# Patient Record
Sex: FEMALE | Race: OTHER | Employment: OTHER | URBAN - NONMETROPOLITAN AREA
[De-identification: names, ages, dates, MRNs, and addresses within clinical notes are randomized per-mention and may not be internally consistent; named-entity substitution may affect disease eponyms.]

---

## 2024-01-31 ENCOUNTER — HOSPITAL ENCOUNTER (OUTPATIENT)
Dept: INFUSION THERAPY | Age: 89
Discharge: HOME | End: 2024-01-31
Payer: MEDICARE

## 2024-01-31 ENCOUNTER — OFFICE VISIT (OUTPATIENT)
Dept: ONCOLOGY | Age: 89
End: 2024-01-31
Payer: MEDICARE

## 2024-01-31 VITALS
DIASTOLIC BLOOD PRESSURE: 70 MMHG | TEMPERATURE: 98.7 F | SYSTOLIC BLOOD PRESSURE: 151 MMHG | HEART RATE: 79 BPM | HEIGHT: 60 IN | RESPIRATION RATE: 16 BRPM | OXYGEN SATURATION: 97 % | WEIGHT: 125.8 LBS | BODY MASS INDEX: 24.7 KG/M2

## 2024-01-31 VITALS
TEMPERATURE: 98.7 F | RESPIRATION RATE: 16 BRPM | HEART RATE: 79 BPM | OXYGEN SATURATION: 97 % | SYSTOLIC BLOOD PRESSURE: 151 MMHG | DIASTOLIC BLOOD PRESSURE: 70 MMHG

## 2024-01-31 DIAGNOSIS — D53.9 MACROCYTIC ANEMIA: Primary | ICD-10-CM

## 2024-01-31 DIAGNOSIS — D53.9 MACROCYTIC ANEMIA: ICD-10-CM

## 2024-01-31 LAB
BASOPHILS # BLD AUTO: 0 THOU/MM3 (ref 0–0.1)
BASOPHILS NFR BLD AUTO: 0 % (ref 0–3)
BUN BLDP-MCNC: 16 MG/DL (ref 8–26)
CHLORIDE BLD-SCNC: 109 MEQ/L (ref 98–109)
CREAT BLD-MCNC: 0.5 MG/DL (ref 0.5–1.2)
EOSINOPHIL # BLD AUTO: 0.2 THOU/MM3 (ref 0–0.4)
EOSINOPHIL NFR BLD AUTO: 3 % (ref 0–4)
ERYTHROCYTE [DISTWIDTH] IN BLOOD BY AUTOMATED COUNT: 13.4 % (ref 11.5–14.5)
GFR SERPL CREATININE-BSD FRML MDRD: > 60 ML/MIN/1.73M2
GLUCOSE BLD-MCNC: 101 MG/DL (ref 70–108)
HCT VFR BLD AUTO: 40.2 % (ref 37–47)
HGB BLD-MCNC: 13.2 GM/DL (ref 12–16)
HGB RETIC QN AUTO: 37.2 PG (ref 28.2–35.7)
IMM GRANULOCYTES # BLD AUTO: 0.01 THOU/MM3 (ref 0–0.07)
IMM GRANULOCYTES NFR BLD AUTO: 0 %
IMM RETICS NFR: 9.6 % (ref 3–15.9)
IONIZED CALCIUM, WHOLE BLOOD: 1.23 MMOL/L (ref 1.12–1.32)
LYMPHOCYTES # BLD AUTO: 2 THOU/MM3 (ref 1–4.8)
LYMPHOCYTES NFR BLD AUTO: 39 % (ref 15–47)
MCH RBC QN AUTO: 32.7 PG (ref 26–33)
MCHC RBC AUTO-ENTMCNC: 32.8 GM/DL (ref 32.2–35.5)
MCV RBC AUTO: 100 FL (ref 81–99)
MONOCYTES # BLD AUTO: 0.4 THOU/MM3 (ref 0.4–1.3)
MONOCYTES NFR BLD AUTO: 7 % (ref 0–12)
NEUTROPHILS NFR BLD AUTO: 50 % (ref 43–75)
PLATELET # BLD AUTO: 160 THOU/MM3 (ref 130–400)
PMV BLD AUTO: 10.8 FL (ref 9.4–12.4)
POTASSIUM BLD-SCNC: 4.2 MEQ/L (ref 3.5–4.9)
RBC # BLD AUTO: 4.04 MILL/MM3 (ref 4.2–5.4)
RETICS # AUTO: 61 THOU/MM3 (ref 20–115)
RETICS/RBC NFR AUTO: 1.5 % (ref 0.5–2)
SEGMENTED NEUTROPHILS ABSOLUTE COUNT: 2.5 THOU/MM3 (ref 1.8–7.7)
SODIUM BLD-SCNC: 140 MEQ/L (ref 138–146)
TOTAL CO2, WHOLE BLOOD: 26 MEQ/L (ref 23–33)
WBC # BLD AUTO: 5.1 THOU/MM3 (ref 4.8–10.8)

## 2024-01-31 PROCEDURE — G8484 FLU IMMUNIZE NO ADMIN: HCPCS | Performed by: NURSE PRACTITIONER

## 2024-01-31 PROCEDURE — 84439 ASSAY OF FREE THYROXINE: CPT

## 2024-01-31 PROCEDURE — 1090F PRES/ABSN URINE INCON ASSESS: CPT | Performed by: NURSE PRACTITIONER

## 2024-01-31 PROCEDURE — 82746 ASSAY OF FOLIC ACID SERUM: CPT

## 2024-01-31 PROCEDURE — 1123F ACP DISCUSS/DSCN MKR DOCD: CPT | Performed by: NURSE PRACTITIONER

## 2024-01-31 PROCEDURE — 83010 ASSAY OF HAPTOGLOBIN QUANT: CPT

## 2024-01-31 PROCEDURE — G8420 CALC BMI NORM PARAMETERS: HCPCS | Performed by: NURSE PRACTITIONER

## 2024-01-31 PROCEDURE — 85025 COMPLETE CBC W/AUTO DIFF WBC: CPT

## 2024-01-31 PROCEDURE — 99211 OFF/OP EST MAY X REQ PHY/QHP: CPT

## 2024-01-31 PROCEDURE — 1036F TOBACCO NON-USER: CPT | Performed by: NURSE PRACTITIONER

## 2024-01-31 PROCEDURE — G8427 DOCREV CUR MEDS BY ELIG CLIN: HCPCS | Performed by: NURSE PRACTITIONER

## 2024-01-31 PROCEDURE — 80076 HEPATIC FUNCTION PANEL: CPT

## 2024-01-31 PROCEDURE — 99203 OFFICE O/P NEW LOW 30 MIN: CPT | Performed by: NURSE PRACTITIONER

## 2024-01-31 PROCEDURE — 83615 LACTATE (LD) (LDH) ENZYME: CPT

## 2024-01-31 PROCEDURE — 80047 BASIC METABLC PNL IONIZED CA: CPT

## 2024-01-31 PROCEDURE — 84443 ASSAY THYROID STIM HORMONE: CPT

## 2024-01-31 PROCEDURE — 83521 IG LIGHT CHAINS FREE EACH: CPT

## 2024-01-31 PROCEDURE — 84155 ASSAY OF PROTEIN SERUM: CPT

## 2024-01-31 PROCEDURE — 82784 ASSAY IGA/IGD/IGG/IGM EACH: CPT

## 2024-01-31 PROCEDURE — 86334 IMMUNOFIX E-PHORESIS SERUM: CPT

## 2024-01-31 PROCEDURE — 84165 PROTEIN E-PHORESIS SERUM: CPT

## 2024-01-31 PROCEDURE — 82607 VITAMIN B-12: CPT

## 2024-01-31 PROCEDURE — 82525 ASSAY OF COPPER: CPT

## 2024-01-31 PROCEDURE — 85046 RETICYTE/HGB CONCENTRATE: CPT

## 2024-01-31 RX ORDER — OMEPRAZOLE 20 MG/1
CAPSULE, DELAYED RELEASE ORAL
COMMUNITY
Start: 2023-11-04

## 2024-01-31 RX ORDER — LISINOPRIL 10 MG/1
10 TABLET ORAL DAILY
COMMUNITY

## 2024-01-31 RX ORDER — HYDROCODONE BITARTRATE AND ACETAMINOPHEN 5; 325 MG/1; MG/1
TABLET ORAL
COMMUNITY
Start: 2023-11-06

## 2024-01-31 RX ORDER — ALENDRONATE SODIUM 70 MG/1
TABLET ORAL
COMMUNITY
Start: 2024-01-27

## 2024-01-31 RX ORDER — ATORVASTATIN CALCIUM 80 MG/1
TABLET, FILM COATED ORAL
COMMUNITY
Start: 2023-11-04

## 2024-01-31 RX ORDER — ASPIRIN 81 MG/1
81 TABLET, CHEWABLE ORAL DAILY
COMMUNITY

## 2024-01-31 RX ORDER — GABAPENTIN 300 MG/1
CAPSULE ORAL
COMMUNITY
Start: 2024-01-16

## 2024-01-31 RX ORDER — MIRTAZAPINE 15 MG/1
TABLET, FILM COATED ORAL
COMMUNITY
Start: 2023-11-04

## 2024-01-31 RX ORDER — CELECOXIB 200 MG/1
CAPSULE ORAL
COMMUNITY
Start: 2023-11-04

## 2024-01-31 RX ORDER — TRIAMTERENE AND HYDROCHLOROTHIAZIDE 37.5; 25 MG/1; MG/1
1 TABLET ORAL DAILY
COMMUNITY

## 2024-01-31 NOTE — PROGRESS NOTES
[facility] [place] [facility]  [facility] [place] [facility]  [address]  [address]  [place] [address]  Dept: [phone]  Loc: [phone]       Visit Date:1/31/2024     Gosia Garcia is a 88 y.o. female who presents today for:   Chief Complaint   Patient presents with    Follow-up     Elevated MCV        HPI:   Gosia Garcia is a 88 y.o. female referred to Hematology/Oncology clinic by Dr. Simmons for evaluation of elevated MCV.  The patient has a history of HTN, hyperlipidemia, GERD, kidney stone, eczema and osteoporosis on treatment with Fosamax.    06/28/2017 WBC 5.8, Hgb 12.9, HCT 40.1%, MCV 95.7, RDW 14.2% and platelet count 172,000.  GFR and BMP results within normal limits.  Serum protein and calcium levels within normal limits.    06/06/2018 WBC 4.0, Hgb 13.2, HCT 39.5%, MCV 94, RDW 14% and platelet count 170,000.  GFR, BMP and hepatic function panel results within normal limits.    01/26/2021 WBC 8.6, Hgb 13.1, HCT 39.9%, MCV 97.1, RDW 14.4% and platelet count 173,000.  GFR and BMP results within normal limits.    11/08/2023 WBC 4.9, Hgb 12.1, HCT 37.7%, , RDW 14.1% and platelet count 192,000.    01/03/2024 WBC 5.4, Hgb 12.9, HCT 39.2%, .6, RDW 13.7% and platelet count 193,000.    The patient denies any headaches, dizziness or vision changes.  No fevers or drenching night sweats.  She is maintaining a good appetite and stable weight.  She denies any chest pain, cough or SOB.  No abdominal pain or GI issues.    GFR and BMP results within normal limits.  WBC 5.1, Hgb 13.2, HCT 40.2%, , RDW 13.4% and platelet count 160,000.           PMH, SH, and FH:  I reviewed the patient's medication and allergy lists. The PMH, SH, and FH were also reviewed as noted on the EMR.        Past Medical History:   Diagnosis Date    Arthritis     Hypertension       Past Surgical History:   Procedure Laterality Date    HYSTERECTOMY, VAGINAL  1970    LITHOTRIPSY  2013      Family

## 2024-02-01 LAB
ALBUMIN SERPL BCG-MCNC: 4.5 G/DL (ref 3.5–5.1)
ALP SERPL-CCNC: 94 U/L (ref 38–126)
ALT SERPL W/O P-5'-P-CCNC: 22 U/L (ref 11–66)
AST SERPL-CCNC: 28 U/L (ref 5–40)
BILIRUB CONJ SERPL-MCNC: < 0.2 MG/DL (ref 0–0.3)
BILIRUB SERPL-MCNC: 0.3 MG/DL (ref 0.3–1.2)
FOLATE SERPL-MCNC: > 20 NG/ML (ref 4.8–24.2)
LDH SERPL L TO P-CCNC: 186 U/L (ref 100–190)
PROT SERPL-MCNC: 7.7 G/DL (ref 6.1–8)
T4 FREE SERPL-MCNC: 1.31 NG/DL (ref 0.93–1.76)
TSH SERPL DL<=0.005 MIU/L-ACNC: 4.78 UIU/ML (ref 0.4–4.2)
VIT B12 SERPL-MCNC: 644 PG/ML (ref 211–911)

## 2024-02-03 LAB
COPPER SERPL-MCNC: 114.3 UG/DL (ref 80–155)
HAPTOGLOB SERPL-MCNC: 152 MG/DL (ref 30–200)
KAPPA/LAMBDA FREE LIGHT CHAINS: NORMAL

## 2024-02-04 LAB — IMMUNOFIXATION WITH QUANT: NORMAL

## 2024-05-01 DIAGNOSIS — D53.9 MACROCYTIC ANEMIA: Primary | ICD-10-CM

## 2024-05-06 ENCOUNTER — OFFICE VISIT (OUTPATIENT)
Dept: ONCOLOGY | Age: 89
End: 2024-05-06

## 2024-05-06 ENCOUNTER — HOSPITAL ENCOUNTER (OUTPATIENT)
Dept: INFUSION THERAPY | Age: 89
Discharge: HOME | End: 2024-05-06
Payer: MEDICARE

## 2024-05-06 VITALS
HEIGHT: 60 IN | HEART RATE: 71 BPM | OXYGEN SATURATION: 96 % | DIASTOLIC BLOOD PRESSURE: 67 MMHG | WEIGHT: 126.4 LBS | SYSTOLIC BLOOD PRESSURE: 137 MMHG | RESPIRATION RATE: 16 BRPM | BODY MASS INDEX: 24.81 KG/M2 | TEMPERATURE: 99 F

## 2024-05-06 VITALS
SYSTOLIC BLOOD PRESSURE: 137 MMHG | TEMPERATURE: 99 F | HEART RATE: 71 BPM | OXYGEN SATURATION: 96 % | RESPIRATION RATE: 16 BRPM | DIASTOLIC BLOOD PRESSURE: 67 MMHG

## 2024-05-06 DIAGNOSIS — R71.8 OTHER ABNORMALITY OF RED BLOOD CELLS: ICD-10-CM

## 2024-05-06 DIAGNOSIS — D53.9 MACROCYTIC ANEMIA: ICD-10-CM

## 2024-05-06 DIAGNOSIS — D53.9 MACROCYTIC ANEMIA: Primary | ICD-10-CM

## 2024-05-06 LAB
BASOPHILS # BLD AUTO: 0.1 THOU/MM3 (ref 0–0.1)
BASOPHILS NFR BLD AUTO: 1 % (ref 0–3)
BUN BLDP-MCNC: 13 MG/DL (ref 8–26)
CHLORIDE BLD-SCNC: 102 MEQ/L (ref 98–109)
CREAT BLD-MCNC: 0.6 MG/DL (ref 0.5–1.2)
EOSINOPHIL # BLD AUTO: 0.2 THOU/MM3 (ref 0–0.4)
EOSINOPHIL NFR BLD AUTO: 5 % (ref 0–4)
ERYTHROCYTE [DISTWIDTH] IN BLOOD BY AUTOMATED COUNT: 13.9 % (ref 11.5–14.5)
GFR SERPL CREATININE-BSD FRML MDRD: 87 ML/MIN/1.73M2
GLUCOSE BLD-MCNC: 97 MG/DL (ref 70–108)
HCT VFR BLD AUTO: 36.5 % (ref 37–47)
HGB BLD-MCNC: 12.2 GM/DL (ref 12–16)
IMM GRANULOCYTES # BLD AUTO: 0.01 THOU/MM3 (ref 0–0.07)
IMM GRANULOCYTES NFR BLD AUTO: 0 %
IONIZED CALCIUM, WHOLE BLOOD: 1.23 MMOL/L (ref 1.12–1.32)
LYMPHOCYTES # BLD AUTO: 1.4 THOU/MM3 (ref 1–4.8)
LYMPHOCYTES NFR BLD AUTO: 29 % (ref 15–47)
MCH RBC QN AUTO: 32.9 PG (ref 26–33)
MCHC RBC AUTO-ENTMCNC: 33.4 GM/DL (ref 32.2–35.5)
MCV RBC AUTO: 98 FL (ref 81–99)
MONOCYTES # BLD AUTO: 0.4 THOU/MM3 (ref 0.4–1.3)
MONOCYTES NFR BLD AUTO: 7 % (ref 0–12)
NEUTROPHILS ABSOLUTE: 2.8 THOU/MM3 (ref 1.8–7.7)
NEUTROPHILS NFR BLD AUTO: 58 % (ref 43–75)
PLATELET # BLD AUTO: 177 THOU/MM3 (ref 130–400)
PMV BLD AUTO: 10.6 FL (ref 9.4–12.4)
POTASSIUM BLD-SCNC: 4.6 MEQ/L (ref 3.5–4.9)
RBC # BLD AUTO: 3.71 MILL/MM3 (ref 4.2–5.4)
SODIUM BLD-SCNC: 137 MEQ/L (ref 138–146)
TOTAL CO2, WHOLE BLOOD: 27 MEQ/L (ref 23–33)
WBC # BLD AUTO: 4.9 THOU/MM3 (ref 4.8–10.8)

## 2024-05-06 PROCEDURE — 85025 COMPLETE CBC W/AUTO DIFF WBC: CPT

## 2024-05-06 PROCEDURE — 86334 IMMUNOFIX E-PHORESIS SERUM: CPT

## 2024-05-06 PROCEDURE — 80076 HEPATIC FUNCTION PANEL: CPT

## 2024-05-06 PROCEDURE — 84165 PROTEIN E-PHORESIS SERUM: CPT

## 2024-05-06 PROCEDURE — 83550 IRON BINDING TEST: CPT

## 2024-05-06 PROCEDURE — 99211 OFF/OP EST MAY X REQ PHY/QHP: CPT

## 2024-05-06 PROCEDURE — 83521 IG LIGHT CHAINS FREE EACH: CPT

## 2024-05-06 PROCEDURE — 82784 ASSAY IGA/IGD/IGG/IGM EACH: CPT

## 2024-05-06 PROCEDURE — 83540 ASSAY OF IRON: CPT

## 2024-05-06 PROCEDURE — 82728 ASSAY OF FERRITIN: CPT

## 2024-05-06 PROCEDURE — 80047 BASIC METABLC PNL IONIZED CA: CPT

## 2024-05-06 PROCEDURE — 84155 ASSAY OF PROTEIN SERUM: CPT

## 2024-05-06 NOTE — PROGRESS NOTES
[facility] [place] [facility]  [facility] [place] [facility]  [address]  [address]  [place] [address]  Dept: [phone]  Loc: [phone]       Visit Date:5/6/2024     Gosia Garcia is a 88 y.o. female who presents today for:   Chief Complaint   Patient presents with    Follow-up     Macrocytic anemia          HPI:   Gosia Garcia is a 88 y.o. female with elevated MCV.  The patient has a history of HTN, hyperlipidemia, GERD, kidney stone, eczema and osteoporosis on treatment with Fosamax.     06/28/2017 WBC 5.8, Hgb 12.9, HCT 40.1%, MCV 95.7, RDW 14.2% and platelet count 172,000.  GFR and BMP results within normal limits.  Serum protein and calcium levels within normal limits.     06/06/2018 WBC 4.0, Hgb 13.2, HCT 39.5%, MCV 94, RDW 14% and platelet count 170,000.  GFR, BMP and hepatic function panel results within normal limits.     01/26/2021 WBC 8.6, Hgb 13.1, HCT 39.9%, MCV 97.1, RDW 14.4% and platelet count 173,000.  GFR and BMP results within normal limits.     11/08/2023 WBC 4.9, Hgb 12.1, HCT 37.7%, , RDW 14.1% and platelet count 192,000.     01/03/2024 WBC 5.4, Hgb 12.9, HCT 39.2%, .6, RDW 13.7% and platelet count 193,000.     01/31/2024 The patient was seen for initial evaluation. WBC 5.1, Hgb 13.2, HCT 40.2%, , RDW 13.4% and platelet count 160,000.  Absolute reticulocyte count, vitamin B12, copper and folate levels within normal limits.  GFR, BMP and hepatic function panel results within normal limits.  Haptoglobin and LDH level within normal limits.  TSH level slightly elevated at 4.780, free T4 within normal limits.  SPEP/MARIA EUGENIA interpretation shows a normal SPEP pattern, no monoclonal protein seen.  Kappa free light chains elevated at 30.87, lambda free light chains within normal limits and kappa/lambda free light chain ratio within normal limits at 1.32            Interval History 5/7/2024: The patient returns for follow-up on her elevated MCV level.

## 2024-05-07 LAB
ALBUMIN SERPL BCG-MCNC: 4.1 G/DL (ref 3.5–5.1)
ALP SERPL-CCNC: 83 U/L (ref 38–126)
ALT SERPL W/O P-5'-P-CCNC: 25 U/L (ref 11–66)
AST SERPL-CCNC: 29 U/L (ref 5–40)
BILIRUB CONJ SERPL-MCNC: < 0.2 MG/DL (ref 0–0.3)
BILIRUB SERPL-MCNC: 0.3 MG/DL (ref 0.3–1.2)
FERRITIN SERPL IA-MCNC: 47 NG/ML (ref 10–291)
IRON SATN MFR SERPL: 48 % (ref 20–50)
IRON SERPL-MCNC: 118 UG/DL (ref 50–170)
PROT SERPL-MCNC: 7.3 G/DL (ref 6.1–8)
REVIEWED BY: NORMAL
SMEAR REVIEW: NORMAL
TIBC SERPL-MCNC: 245 UG/DL (ref 171–450)

## 2024-05-08 LAB — KAPPA/LAMBDA FREE LIGHT CHAINS: NORMAL

## 2024-05-09 LAB — IMMUNOFIXATION WITH QUANT: NORMAL
